# Patient Record
(demographics unavailable — no encounter records)

---

## 2024-10-08 NOTE — REVIEW OF SYSTEMS
[As Noted in HPI] : as noted in HPI [Negative] : Heme/Lymph [FreeTextEntry9] : R knee pain and crepitus on her L hip

## 2024-10-08 NOTE — DATA REVIEWED
[FreeTextEntry1] : Laboratory and radiology studies that were personally reviewed at today's visit are summarized below and above: 4-2024:  lumbar spine xray/hip xray:  narrowed L4-L5 and L5-S1 disc spaces, preserved hip joint spaces. enthesophyte formation on superolateral right greater trochanter margin.    Knee xray (8-5-2022): OA Xray hip (8-5-2022): DJD Lumbat spine - OA and enthesophyte DEXA:  8-2022L  osteopenia frax 10/1.6  3-1-21:  synovial fluid:  no crystals,  IDENTRA - negative ELIDA - negative ANCA, MPO, PR3 negative Vitamin D = 27.3 Ca 10.4 ESR = 26 CRP 0.51 mg/dL A1c 7.2% May 2020 and 7.6 in  after being on steroids.   COVID-19 Ab negative May 2020  US Left hand/wrist - scattered DJD changes,  small second PIP joint effusion, chronic synovitis and small fluid in the wrist.   5-27-20:  hgb = 11.3 and normocytic.  vitamin d = 25.  5- Dexa: OPenia   Dexa: OPenia with average risk

## 2024-10-08 NOTE — HISTORY OF PRESENT ILLNESS
[RF] : negative rheumatoid factor [CCP] : negative CCP antibody [Joint Swelling] : no joint swelling [Rash] : no rash [Shortness of Breath] : no shortness of breath [Joint Pain] : no joint pain [Ocular Symptoms] : no ocular symptoms [Dysphonia] : no dysphonia [Morning Stiffness] : no morning stiffness [Chest Pain] : no chest pain [Fatigue] : no fatigue [TextBox_2] : 9-2020 [TextBox_40] : HCQ [TextBox_70] : really feels that PT was helpful  and now continues stretches at home  seeing pmd for physical in 2 weeks.  has a clicking in the left hip but no pain since injection in 4-2024

## 2024-10-08 NOTE — PHYSICAL EXAM
[General Appearance - Alert] : alert [General Appearance - In No Acute Distress] : in no acute distress [General Appearance - Well Nourished] : well nourished [General Appearance - Well Developed] : well developed [Sclera] : the sclera and conjunctiva were normal [Extraocular Movements] : extraocular movements were intact [Outer Ear] : the ears and nose were normal in appearance [Skin Color & Pigmentation] : normal skin color and pigmentation [] : no rash [No Focal Deficits] : no focal deficits [Oriented To Time, Place, And Person] : oriented to person, place, and time [Impaired Insight] : insight and judgment were intact [Affect] : the affect was normal [Mood] : the mood was normal [FreeTextEntry1] : No psoriasis

## 2024-10-08 NOTE — ASSESSMENT
[FreeTextEntry1] : # Seronegative inflammatory arthritis  blood work negative for RA but US with synovitis.  Prior blood work reviewed check blood work today patient is feeling fine w/ mg BID  Optho Q 6 months  Continue on HCQ   # Suspect OA knees/lower back and hips  with chronic changes  and OA on imaging.  [] monitor over time. medications. [] continue home exercises    More than 50% of the encounter was spent counselling  LATOSHA NORTON on differential, workup, disease course, and treatment/management.   Education was provided to LATOSHA NORTON during this encounter. All questions and concerns were answered and addressed in detail.  LATOSHA NORTON verbalized understanding and agreed to plan  LATOSHA NORTON has been instructed to call for an earlier appointment if new symptoms develop  LATOSHA NORTON has been instructed to make a follow up appointment  6 months

## 2024-10-21 NOTE — PHYSICAL EXAM
[No Acute Distress] : no acute distress [Well Nourished] : well nourished [Well Developed] : well developed [Well-Appearing] : well-appearing [Normal Voice/Communication] : normal voice/communication [Normal Sclera/Conjunctiva] : normal sclera/conjunctiva [PERRL] : pupils equal round and reactive to light [EOMI] : extraocular movements intact [Normal Outer Ear/Nose] : the outer ears and nose were normal in appearance [Normal Oropharynx] : the oropharynx was normal [No JVD] : no jugular venous distention [Supple] : supple [No Respiratory Distress] : no respiratory distress  [No Accessory Muscle Use] : no accessory muscle use [Clear to Auscultation] : lungs were clear to auscultation bilaterally [Normal Rate] : normal rate  [Regular Rhythm] : with a regular rhythm [Normal S1, S2] : normal S1 and S2 [No Carotid Bruits] : no carotid bruits [No Edema] : there was no peripheral edema [No Extremity Clubbing/Cyanosis] : no extremity clubbing/cyanosis [Declined Breast Exam] : declined breast exam  [Soft] : abdomen soft [Normal Bowel Sounds] : normal bowel sounds [Declined Rectal Exam] : declined rectal exam [No CVA Tenderness] : no CVA  tenderness [No Spinal Tenderness] : no spinal tenderness [No Rash] : no rash [No Focal Deficits] : no focal deficits [Normal Gait] : normal gait [Speech Grossly Normal] : speech grossly normal [Normal Affect] : the affect was normal [Alert and Oriented x3] : oriented to person, place, and time [de-identified] : Wearing glasses [de-identified] : No sinus tenderness [de-identified] : No stridor [de-identified] : R = 16; no cough noted; good air entry; no wheezing [de-identified] : Normal bilateral radial pulses; no cords [de-identified] : Declined

## 2024-10-21 NOTE — ASSESSMENT
[FreeTextEntry1] : Hypertension Blood pressure in good range today Daily exercise, weight control, low-sodium diet Continue losartan HCTZ  Hyperlipidemia Exercise, weight control, low-fat diet Lipid profile to be done May need statin therapy if values above goal  Diabetes ADA diet Exercise, weight control On Metformin and Mounjaro as per Dr. Butterfield Endocrine follow-up Follow-up with ophthalmology and podiatry Hemoglobin A1c level and MA to be done Monitor sugars at home  Joint and back pain/muscle pain Rheum f/u planned Labs requested by Dr. Calderón sent today  Continue meds, diet, exercise as outlined F/U with all MD's Vaccines reviewed = flu vaccine given today Prescription to do mammography with breast ultrasonography given To call for any medical issues or if her status worsens/changes and to return to be seen immediately RTC 6 months for CPE and as needed All of the above was discussed in detail with the patient and all of her questions were answered She verbally confirmed understanding of all of the above and agreement with the above plan

## 2024-10-21 NOTE — HEALTH RISK ASSESSMENT
[No] : In the past 12 months have you used drugs other than those required for medical reasons? No [No falls in past year] : Patient reported no falls in the past year [Patient refused screening] : Patient refused screening [de-identified] : Rheumatology, physical therapy [de-identified] : Walking [de-identified] : Regular [Reviewed no changes] : Reviewed, no changes [AdvancecareDate] : 10/24 [Former] : Former

## 2024-10-21 NOTE — PHYSICAL EXAM
[No Acute Distress] : no acute distress [Well Nourished] : well nourished [Well Developed] : well developed [Well-Appearing] : well-appearing [Normal Voice/Communication] : normal voice/communication [Normal Sclera/Conjunctiva] : normal sclera/conjunctiva [PERRL] : pupils equal round and reactive to light [EOMI] : extraocular movements intact [Normal Outer Ear/Nose] : the outer ears and nose were normal in appearance [Normal Oropharynx] : the oropharynx was normal [No JVD] : no jugular venous distention [Supple] : supple [No Respiratory Distress] : no respiratory distress  [No Accessory Muscle Use] : no accessory muscle use [Clear to Auscultation] : lungs were clear to auscultation bilaterally [Normal Rate] : normal rate  [Regular Rhythm] : with a regular rhythm [Normal S1, S2] : normal S1 and S2 [No Carotid Bruits] : no carotid bruits [No Edema] : there was no peripheral edema [No Extremity Clubbing/Cyanosis] : no extremity clubbing/cyanosis [Declined Breast Exam] : declined breast exam  [Soft] : abdomen soft [Normal Bowel Sounds] : normal bowel sounds [Declined Rectal Exam] : declined rectal exam [No CVA Tenderness] : no CVA  tenderness [No Spinal Tenderness] : no spinal tenderness [No Rash] : no rash [No Focal Deficits] : no focal deficits [Normal Gait] : normal gait [Speech Grossly Normal] : speech grossly normal [Normal Affect] : the affect was normal [Alert and Oriented x3] : oriented to person, place, and time [de-identified] : Wearing glasses [de-identified] : No sinus tenderness [de-identified] : No stridor [de-identified] : R = 16; no cough noted; good air entry; no wheezing [de-identified] : Normal bilateral radial pulses; no cords [de-identified] : Declined

## 2024-10-21 NOTE — HISTORY OF PRESENT ILLNESS
[FreeTextEntry1] : Comes in for follow-up medical visit. [de-identified] : Needs flu vaccine today. Overdue for physical.  Prescription to do mammo and ultrasound given to do a soon as possible as overdue. Completely retired and happy. Feeling well.  Denies any new complaints today.

## 2024-10-21 NOTE — HEALTH RISK ASSESSMENT
[No] : In the past 12 months have you used drugs other than those required for medical reasons? No [No falls in past year] : Patient reported no falls in the past year [Patient refused screening] : Patient refused screening [de-identified] : Rheumatology, physical therapy [de-identified] : Walking [de-identified] : Regular [Reviewed no changes] : Reviewed, no changes [AdvancecareDate] : 10/24 [Former] : Former

## 2024-10-21 NOTE — HISTORY OF PRESENT ILLNESS
[FreeTextEntry1] : Comes in for follow-up medical visit. [de-identified] : Needs flu vaccine today. Overdue for physical.  Prescription to do mammo and ultrasound given to do a soon as possible as overdue. Completely retired and happy. Feeling well.  Denies any new complaints today.

## 2024-11-19 NOTE — PHYSICAL EXAM
[Alert] : alert [No Acute Distress] : no acute distress [Normal Sclera/Conjunctiva] : normal sclera/conjunctiva [EOMI] : extra ocular movement intact [No LAD] : no lymphadenopathy [Thyroid Not Enlarged] : the thyroid was not enlarged [No Respiratory Distress] : no respiratory distress [Clear to Auscultation] : lungs were clear to auscultation bilaterally [Normal S1, S2] : normal S1 and S2 [Regular Rhythm] : with a regular rhythm [No Edema] : no peripheral edema [Normal Bowel Sounds] : normal bowel sounds [Not Tender] : non-tender [Not Distended] : not distended [Soft] : abdomen soft [Normal Anterior Cervical Nodes] : no anterior cervical lymphadenopathy [No Spinal Tenderness] : no spinal tenderness [No Clubbing, Cyanosis] : no clubbing  or cyanosis of the fingernails [No Rash] : no rash [Right foot was examined, including] : right foot ~C was examined, including visual inspection with sensory and pulse exams [Left foot was examined, including] : left foot ~C was examined, including visual inspection with sensory and pulse exams [Normal] : normal [2+] : 2+ in the dorsalis pedis [Diminished Throughout Both Feet] : normal tactile sensation with monofilament testing throughout both feet [Normal Reflexes] : deep tendon reflexes were 2+ and symmetric [Normal Affect] : the affect was normal [Normal Mood] : the mood was normal [FreeTextEntry2] : onychomycosis [FreeTextEntry6] : onychomycosis [Kyphosis] : no kyphosis present [de-identified] : last foot exam in May 2024

## 2024-11-19 NOTE — ASSESSMENT
[Carbohydrate Consistent Diet] : carbohydrate consistent diet [Diabetes Foot Care] : diabetes foot care [Long Term Vascular Complications] : long term vascular complications of diabetes [Importance of Diet and Exercise] : importance of diet and exercise to improve glycemic control, achieve weight loss and improve cardiovascular health [Incretin Mimetic Therapy] : Risks and benefits of incretin mimetic therapy were discussed with the patient including nauseau, pancreatitis and potential risk of medullary thyroid cancer [FreeTextEntry1] : 76 y.o. female with h/o Type 2 DM, HTN, hyperlipidemia, thyroid nodules and osteopenia.  1. Type 2 DM- Good control with Hba1c of 6.4% in October 2024. Encouraged a carbohydrate consistent diet and exercise. Will continue Metformin 1,000 mg BID and Mounjaro 5 mg SQ weekly. Stable urine alb/cr ratio in October 2024.   2. HTN- BP is at goal and will continue current medication including ARB.  3. Hyperlipidemia- Lipids are at goal. Off statin because of muscle symptoms. Encouraged low fat diet and exercise.  4. Thyroid nodules- Patient is euthyroid. Thyroid nodules are stable and will repeat ultrasound in 2026.  5. Osteopenia- DEXA scan performed in October 2024 was reviewed. Given decrease in BMD, recommend medical therapy. We discussed option of oral bisphosphonates and reviewed risks and benefits including ONJ and atypical femur fractures. She will consider options and visit with her dentist. She will notify the office if she decides to start treatment. Encouraged weight bearing activity. Repeat DEXA scan in 2026. Will continue vitamin D supplement.  6. Hypercalcemia- Discussed pathophysiology including possible etiology such as primary hyperparathyroidism, dehydration and secondary to HCTZ use. Would avoid calcium supplements and encouraged hydration. Will monitor for now given serum calcium is < 11.5. Would consider oral bisphosphonate given decrease in BMD.     Follow up in 4 to 6 months.   [Bisphosphonate Therapy] : Risks  and benefits of bisphosphonate therapy were  discussed with the patient including gastroesophageal irritation, osteonecrosis of the jaw, and atypical femur fractures, and acute phase reaction [Bisphosphonates] : The patient was instructed to take bisphosphonates on an empty stomach with a full glass of water,and wait at least 30 minutes before eating or lying down

## 2024-11-19 NOTE — REVIEW OF SYSTEMS
[Joint Pain] : joint pain [Negative] : Integumentary [Fatigue] : no fatigue [Recent Weight Gain (___ Lbs)] : no recent weight gain [Recent Weight Loss (___ Lbs)] : no recent weight loss [Pain/Numbness of Digits] : no pain/numbness of digits [Polydipsia] : no polydipsia [Cold Intolerance] : no cold intolerance [Heat Intolerance] : no heat intolerance [Swelling] : no swelling

## 2024-11-19 NOTE — HISTORY OF PRESENT ILLNESS
[FreeTextEntry1] : 76 y.o. female with h/o Type 2 DM, HTN, hyperlipidemia and thyroid nodules here for follow up visit.   Saw rheumatology and diagnosed with inflammatory arthritis. C/o wrist and finger pain but better and follows with rheumatology.   Monitoring FS 2 to 3 times per week and fasting numbers are on average 110s.  Taking Metformin 1,000 mg BID and Mounjaro 5 mg SQ weekly  Was taking Victoza 1.8 mg SQ daily which she was tolerating. Tried Jardiance 10 mg daily in December 2020 but stopped secondary to mycotic infection.  No hypoglycemia. Reports weight is stable. Reports decrease in activity. Diet has been stable. No polyuria and no polydipsia.   UTD with ophthalmology (2024) and no retinopathy. No foot complaints. Sees podiatry, Dr. Diallo, for nail care. Off statin because of muscle symptoms. No proteinuria.   In regard to thyroid disease, no neck complaints.  Thyroid ultrasound in October 2017 showed stable b/l nodules but increase in left lower pole measuring 1.4 cm. Was planning for FNA of b/l dominant nodules; however, patient saw radiology Dr. Gotti who did not feel FNA was needed.  Thyroid ultrasound on 12/9/2020 showed stable b/l nodules largest on left 1.6 cm and on right 1.0 cm.  Thyroid ultrasound on 5/30/23 shows b/l nodules with right dominant spongiform nodule 1.9 cm and left lower pole nodule 1.0 cm.   In regard to osteopenia, no falls or fractures.  Taking vitamin D 2,000 Iu every day. Less GERD and off medication. Has diet modification and elevation of head of the bed. Planning to follow up with dentist. Now has dentures.   DEXA scan in May 2016 showed falsely elevated spine 2.0 with arthritis, left femoral neck -1.3 and total hip -0.2.  DEXA scan in May 2018 shows left femoral neck -1.5 which is a 3.7% decrease, total hip -0.4 which is a 3% decrease. 1/3 radius -1.4.  DEXA scan in August 2020 showed 1/3 radius -1.5 which is stable, left femoral neck -1.3 and total hip -0.7 which are stable. DEXA scan in August 2022 shows left femoral neck -1.2 which is stable, total hip -0.5 which is stable. 1/3 radius -1.3 which is stable. DEXA scan in October 2024 shows left femoral neck -1.6 which is a 5.3% decrease and total hip -0.9 which is a 5.3% decrease and 1/3 radius -2.0 which is a 6.8% decrease= FRAX score 12%/2.4%.    H/o hypercalcemia with mid normal intact PTH levels. No h/o kidney stones. She stopped all calcium supplements but takes MVI. Does not drink much water throughout the day. Also on HCTZ for her HTN. No polyuria or polydipsia.

## 2024-11-19 NOTE — HISTORY OF PRESENT ILLNESS
[FreeTextEntry1] : 76 y.o. female with h/o Type 2 DM, HTN, hyperlipidemia and thyroid nodules here for follow up visit.   Saw rheumatology and diagnosed with inflammatory arthritis. C/o wrist and finger pain but better and follows with rheumatology.   Monitoring FS 2 to 3 times per week and fasting numbers are on average 110s.  Taking Metformin 1,000 mg BID and Mounjaro 5 mg SQ weekly  Was taking Victoza 1.8 mg SQ daily which she was tolerating. Tried Jardiance 10 mg daily in December 2020 but stopped secondary to mycotic infection.  No hypoglycemia. Reports weight is stable. Reports decrease in activity. Diet has been stable. No polyuria and no polydipsia.   UTD with ophthalmology (2024) and no retinopathy. No foot complaints. Sees podiatry, Dr. Diallo, for nail care. Off statin because of muscle symptoms. No proteinuria.   In regard to thyroid disease, no neck complaints.  Thyroid ultrasound in October 2017 showed stable b/l nodules but increase in left lower pole measuring 1.4 cm. Was planning for FNA of b/l dominant nodules; however, patient saw radiology Dr. oGtti who did not feel FNA was needed.  Thyroid ultrasound on 12/9/2020 showed stable b/l nodules largest on left 1.6 cm and on right 1.0 cm.  Thyroid ultrasound on 5/30/23 shows b/l nodules with right dominant spongiform nodule 1.9 cm and left lower pole nodule 1.0 cm.   In regard to osteopenia, no falls or fractures.  Taking vitamin D 2,000 Iu every day. Less GERD and off medication. Has diet modification and elevation of head of the bed. Planning to follow up with dentist. Now has dentures.   DEXA scan in May 2016 showed falsely elevated spine 2.0 with arthritis, left femoral neck -1.3 and total hip -0.2.  DEXA scan in May 2018 shows left femoral neck -1.5 which is a 3.7% decrease, total hip -0.4 which is a 3% decrease. 1/3 radius -1.4.  DEXA scan in August 2020 showed 1/3 radius -1.5 which is stable, left femoral neck -1.3 and total hip -0.7 which are stable. DEXA scan in August 2022 shows left femoral neck -1.2 which is stable, total hip -0.5 which is stable. 1/3 radius -1.3 which is stable. DEXA scan in October 2024 shows left femoral neck -1.6 which is a 5.3% decrease and total hip -0.9 which is a 5.3% decrease and 1/3 radius -2.0 which is a 6.8% decrease= FRAX score 12%/2.4%.    H/o hypercalcemia with mid normal intact PTH levels. No h/o kidney stones. She stopped all calcium supplements but takes MVI. Does not drink much water throughout the day. Also on HCTZ for her HTN. No polyuria or polydipsia.

## 2024-11-19 NOTE — PHYSICAL EXAM
[Alert] : alert [No Acute Distress] : no acute distress [Normal Sclera/Conjunctiva] : normal sclera/conjunctiva [EOMI] : extra ocular movement intact [No LAD] : no lymphadenopathy [Thyroid Not Enlarged] : the thyroid was not enlarged [No Respiratory Distress] : no respiratory distress [Clear to Auscultation] : lungs were clear to auscultation bilaterally [Normal S1, S2] : normal S1 and S2 [Regular Rhythm] : with a regular rhythm [No Edema] : no peripheral edema [Normal Bowel Sounds] : normal bowel sounds [Not Tender] : non-tender [Not Distended] : not distended [Soft] : abdomen soft [Normal Anterior Cervical Nodes] : no anterior cervical lymphadenopathy [No Spinal Tenderness] : no spinal tenderness [No Clubbing, Cyanosis] : no clubbing  or cyanosis of the fingernails [No Rash] : no rash [Right foot was examined, including] : right foot ~C was examined, including visual inspection with sensory and pulse exams [Left foot was examined, including] : left foot ~C was examined, including visual inspection with sensory and pulse exams [Normal] : normal [2+] : 2+ in the dorsalis pedis [Diminished Throughout Both Feet] : normal tactile sensation with monofilament testing throughout both feet [Normal Reflexes] : deep tendon reflexes were 2+ and symmetric [Normal Affect] : the affect was normal [Normal Mood] : the mood was normal [FreeTextEntry2] : onychomycosis [FreeTextEntry6] : onychomycosis [Kyphosis] : no kyphosis present [de-identified] : last foot exam in May 2024

## 2025-02-10 NOTE — PHYSICAL EXAM
[No Acute Distress] : in no acute distress [Well developed] : well developed [Well Nourished] : ~L well nourished [Good Hygeine] : demonstrates good hygeine [Oriented x3] : oriented to person, place, and time [Normal Memory] : ~T memory was ~L unimpaired [Normal Mood/Affect] : mood and affect are normal [Warm and Dry] : was warm and dry to touch [Normal Gait] : gait was normal [Vulvar Atrophy] : vulvar atrophy [Labia Majora] : were normal [Normal] : was normal [Normal Appearance] : general appearance was normal [Atrophy] : atrophy [No Bleeding] : there was no active vaginal bleeding [Anxiety] : patient is not anxious [Tenderness] : ~T no ~M abdominal tenderness observed [Distended] : not distended

## 2025-02-10 NOTE — PROCEDURE
[Good Fit] : fits well [Discharge] : there is vaginal discharge [Pessary Inserted] : inserted [Pessary Washed] : washed [None] : no bleeding [Medication Review] : Medicaiton Review: Patient verbalizes understanding of risks and benefits [Bowel Management] : Bowel Management: patient verbalizes understanding of daily dietary fiber intake [Refit] : refit is not needed [Erosion] : no evidence of erosion [Erythema] : no erythema [Infection] : no evidence of infection [FreeTextEntry1] : RS #5

## 2025-02-10 NOTE — HISTORY OF PRESENT ILLNESS
[FreeTextEntry1] : Leonora is a 77 y/o that presents to the office for follow up for pelvic prolapse, supported with RS #5.  She denies any pelvic pain or vaginal bleeding. She denies any issue with urination or moving her bowels.  She is performing self-care and denies issues.

## 2025-02-10 NOTE — DISCUSSION/SUMMARY
[FreeTextEntry1] : Pelvic Prolapse: -Continue weekly self-care  -Continue with RS #5 -Precautions and instructions given.  Will follow up in 3 months or sooner if needed. Advised to call the office should any issues arise.

## 2025-04-09 NOTE — HEALTH RISK ASSESSMENT
[Very Good] : ~his/her~  mood as very good [Intercurrent Urgi Care visits] : went to urgent care [Monthly or less (1 pt)] : Monthly or less (1 point) [1 or 2 (0 pts)] : 1 or 2 (0 points) [Never (0 pts)] : Never (0 points) [No] : In the past 12 months have you used drugs other than those required for medical reasons? No [No falls in past year] : Patient reported no falls in the past year [0] : 2) Feeling down, depressed, or hopeless: Not at all (0) [PHQ-2 Negative - No further assessment needed] : PHQ-2 Negative - No further assessment needed [PHQ-9 Negative - No further assessment needed] : PHQ-9 Negative - No further assessment needed [Former] : Former [5-9] : 5-9 [> 15 Years] : > 15 Years [Patient declined colonoscopy] : Patient declined colonoscopy [HIV test declined] : HIV test declined [Hepatitis C test declined] : Hepatitis C test declined [With Family] : lives with family [# of Members in Household ___] :  household currently consist of [unfilled] member(s) [Retired] : retired [College] : College [] :  [# Of Children ___] : has [unfilled] children [Feels Safe at Home] : Feels safe at home [Fully functional (bathing, dressing, toileting, transferring, walking, feeding)] : Fully functional (bathing, dressing, toileting, transferring, walking, feeding) [Fully functional (using the telephone, shopping, preparing meals, housekeeping, doing laundry, using] : Fully functional and needs no help or supervision to perform IADLs (using the telephone, shopping, preparing meals, housekeeping, doing laundry, using transportation, managing medications and managing finances) [Smoke Detector] : smoke detector [Carbon Monoxide Detector] : carbon monoxide detector [Seat Belt] :  uses seat belt [Sunscreen] : uses sunscreen [With Patient/Caregiver] : , with patient/caregiver [Designated Healthcare Proxy] : Designated healthcare proxy [Name: ___] : Health Care Proxy's Name: [unfilled]  [Relationship: ___] : Relationship: [unfilled] [Little interest or pleasure doing things] : 1) Little interest or pleasure doing things [Feeling down, depressed, or hopeless] : 2) Feeling down, depressed, or hopeless [None] : Patient does not have any barriers to medication adherence [Opioids] : opioids [NO] : No [No Retinopathy] : No retinopathy [Yes] : Reviewed medication list for presence of high-risk medications. [FreeTextEntry1] : none [de-identified] : rheum, endocrine, ophtho, GYN [Audit-CScore] : 1 [de-identified] : walking/biking [de-identified] : regular [[id number]] : 0 [EyeExamDate] : 03/25 [Change in mental status noted] : No change in mental status noted [Language] : denies difficulty with language [Behavior] : denies difficulty with behavior [Learning/Retaining New Information] : denies difficulty learning/retaining new information [Handling Complex Tasks] : denies difficulty handling complex tasks [Reasoning] : denies difficulty with reasoning [Spatial Ability and Orientation] : denies difficulty with spatial ability and orientation [Reports changes in hearing] : Reports no changes in hearing [Reports changes in vision] : Reports no changes in vision [Reports changes in dental health] : Reports no changes in dental health [Travel to Developing Areas] : does not  travel to developing areas [TB Exposure] : is not being exposed to tuberculosis [Caregiver Concerns] : does not have caregiver concerns [MammogramDate] : 11/24 [PapSmearDate] : 2025 [BoneDensityDate] : 10/24 [ColonoscopyDate] : 10/14 [AdvancecareDate] : 04/25

## 2025-04-09 NOTE — HISTORY OF PRESENT ILLNESS
[FreeTextEntry1] : Comes in for annual physical. [de-identified] : Feeling well. Had covid 8/2022. Mild case with no residual effects.

## 2025-04-09 NOTE — ASSESSMENT
[Vaccines Reviewed] : Immunizations reviewed today. Please see immunization details in the vaccine log within the immunization flowsheet.  [FreeTextEntry1] : See health maintenance assessment and plan outlined below. In addition: Full labs and urine testing done today Had bone density 2024 Podiatry f/u 2025 for diabetic foot care Ortho f/u as needed 2025 Rheum f/u 2025 Colonoscopy to be done 2025 as overdue Kit given to do FIT testing for now Saw GYN 2025 Did mammograms 11/24 = due again 11/2025 Urology f/u as needed 2025 Continue meds, diet, exercise as outlined EKG done and report d/w patient and scanned Consider cardiology evaluation 2025 CXR done 2023 Endocrine f/u 2025 with thyroid US Vaccines reviewed with patient ENT f/u 2025 To see derm, ophtho, dentist 2025 RTC for annual physical  RTC 4 months and as needed To call for any medical issues or if her status worsens/changes and to return to be seen immediately All of the above was discussed in detail with the patient and all of her questions were answered She verbally confirmed understanding of all of the above and agreement with the above plan

## 2025-04-09 NOTE — PHYSICAL EXAM
[No Acute Distress] : no acute distress [Well Nourished] : well nourished [Well Developed] : well developed [Well-Appearing] : well-appearing [Normal Voice/Communication] : normal voice/communication [Normal Sclera/Conjunctiva] : normal sclera/conjunctiva [PERRL] : pupils equal round and reactive to light [EOMI] : extraocular movements intact [Normal Outer Ear/Nose] : the outer ears and nose were normal in appearance [Normal Oropharynx] : the oropharynx was normal [No JVD] : no jugular venous distention [Supple] : supple [No Lymphadenopathy] : no lymphadenopathy [No Respiratory Distress] : no respiratory distress  [Clear to Auscultation] : lungs were clear to auscultation bilaterally [No Accessory Muscle Use] : no accessory muscle use [Normal Rate] : normal rate  [Regular Rhythm] : with a regular rhythm [Normal S1, S2] : normal S1 and S2 [No Carotid Bruits] : no carotid bruits [Pedal Pulses Present] : the pedal pulses are present [No Edema] : there was no peripheral edema [No Extremity Clubbing/Cyanosis] : no extremity clubbing/cyanosis [Normal Appearance] : normal in appearance [No Nipple Discharge] : no nipple discharge [No Axillary Lymphadenopathy] : no axillary lymphadenopathy [Soft] : abdomen soft [Non Tender] : non-tender [Non-distended] : non-distended [No Masses] : no abdominal mass palpated [No HSM] : no HSM [Normal Bowel Sounds] : normal bowel sounds [Declined Rectal Exam] : declined rectal exam [Normal Supraclavicular Nodes] : no supraclavicular lymphadenopathy [Normal Axillary Nodes] : no axillary lymphadenopathy [Normal Posterior Cervical Nodes] : no posterior cervical lymphadenopathy [Normal Anterior Cervical Nodes] : no anterior cervical lymphadenopathy [No CVA Tenderness] : no CVA  tenderness [No Spinal Tenderness] : no spinal tenderness [Grossly Normal Strength/Tone] : grossly normal strength/tone [No Rash] : no rash [Normal Gait] : normal gait [Coordination Grossly Intact] : coordination grossly intact [No Focal Deficits] : no focal deficits [Speech Grossly Normal] : speech grossly normal [Normal Affect] : the affect was normal [Alert and Oriented x3] : oriented to person, place, and time [de-identified] : Wearing glasses [de-identified] :  no ST; right TM obscured with cerumen; left TM clear [de-identified] : No stridor or TM [de-identified] : R=16 [de-identified] : normal radial pulses; no cords [de-identified] : as per GYN

## 2025-05-05 NOTE — PHYSICAL EXAM
[Alert] : alert [No Acute Distress] : no acute distress [Normal Sclera/Conjunctiva] : normal sclera/conjunctiva [EOMI] : extra ocular movement intact [No LAD] : no lymphadenopathy [Thyroid Not Enlarged] : the thyroid was not enlarged [No Respiratory Distress] : no respiratory distress [Clear to Auscultation] : lungs were clear to auscultation bilaterally [Normal S1, S2] : normal S1 and S2 [Regular Rhythm] : with a regular rhythm [No Edema] : no peripheral edema [Normal Bowel Sounds] : normal bowel sounds [Not Tender] : non-tender [Not Distended] : not distended [Soft] : abdomen soft [Normal Anterior Cervical Nodes] : no anterior cervical lymphadenopathy [No Spinal Tenderness] : no spinal tenderness [No Clubbing, Cyanosis] : no clubbing  or cyanosis of the fingernails [No Rash] : no rash [Normal Reflexes] : deep tendon reflexes were 2+ and symmetric [Normal Affect] : the affect was normal [Normal Mood] : the mood was normal [Right foot was examined, including] : right foot ~C was examined, including visual inspection with sensory and pulse exams [Left foot was examined, including] : left foot ~C was examined, including visual inspection with sensory and pulse exams [2+] : 2+ in the dorsalis pedis [Kyphosis] : no kyphosis present [Diminished Throughout Both Feet] : normal tactile sensation with monofilament testing throughout both feet [FreeTextEntry1] : foot deformity [FreeTextEntry2] : dystrophic nails [FreeTextEntry5] : foot deformity [FreeTextEntry6] : dystrophic nails

## 2025-05-05 NOTE — ASSESSMENT
[Carbohydrate Consistent Diet] : carbohydrate consistent diet [Diabetes Foot Care] : diabetes foot care [Long Term Vascular Complications] : long term vascular complications of diabetes [Importance of Diet and Exercise] : importance of diet and exercise to improve glycemic control, achieve weight loss and improve cardiovascular health [Bisphosphonate Therapy] : Risks  and benefits of bisphosphonate therapy were  discussed with the patient including gastroesophageal irritation, osteonecrosis of the jaw, and atypical femur fractures, and acute phase reaction [Incretin Mimetic Therapy] : Risks and benefits of incretin mimetic therapy were discussed with the patient including nauseau, pancreatitis and potential risk of medullary thyroid cancer [Bisphosphonates] : The patient was instructed to take bisphosphonates on an empty stomach with a full glass of water,and wait at least 30 minutes before eating or lying down [FreeTextEntry1] : 76 y.o. female with h/o Type 2 DM, HTN, hyperlipidemia, thyroid nodules and osteopenia.  1. Type 2 DM- Good control with Hba1c of 6.5% in April 2025. Encouraged a carbohydrate consistent diet and exercise. Will continue Metformin 1,000 mg BID and Mounjaro 5 mg SQ weekly. Recommend taking famotidine in advance of Mounjaro injection and for 48 hours after injection. Stable urine alb/cr ratio in April 2025.   2. HTN- BP is at goal and will continue current medication including ARB.  3. Hyperlipidemia- LDL is above goal. Off statin because of muscle symptoms. Encouraged low fat diet and exercise.  4. Thyroid nodules- Patient is euthyroid. Thyroid nodules are stable and will repeat ultrasound in 2026.  5. Osteopenia- DEXA scan performed in October 2024 was reviewed. Given decrease in BMD, recommend medical therapy. We discussed option of oral bisphosphonates and reviewed risks and benefits including ONJ and atypical femur fractures. She prefers to monitor for now. Encouraged weight bearing activity. Repeat DEXA scan in 2026. Will continue vitamin D supplement.  6. Hypercalcemia- Discussed pathophysiology including possible etiology such as primary hyperparathyroidism, dehydration and secondary to HCTZ use. Would avoid calcium supplements and encouraged hydration. Will monitor for now given serum calcium is < 11.5. Would consider oral bisphosphonate given decrease in BMD.     Follow up in 4 to 6 months.

## 2025-05-05 NOTE — HISTORY OF PRESENT ILLNESS
[FreeTextEntry1] : 76 y.o. female with h/o Type 2 DM, HTN, hyperlipidemia, thyroid nodules and osteopenia here for follow up visit.   No acute events since the last visit.   Saw rheumatology and diagnosed with inflammatory arthritis. C/o wrist and finger pain but better and follows with rheumatology.   Monitoring FS 2 to 3 times per week and fasting numbers are on average 110s.  Taking Metformin 1,000 mg BID and Mounjaro 5 mg SQ weekly  She reports 1 to 2 days after Mounjaro injection develops belching, stomach rumbling and diarrhea but brief.    She was taking Victoza 1.8 mg SQ daily which she was tolerating.  Tried Jardiance 10 mg daily in December 2020 but stopped secondary to mycotic infection.   No hypoglycemia. Reports weight is stable. Reports decrease in activity. Diet has been stable. No polyuria and no polydipsia.   UTD with ophthalmology (2025) and no retinopathy. No foot complaints. Sees podiatry, Dr. Diallo, for nail care. Off statin because of muscle symptoms. No proteinuria.   In regard to thyroid disease, no neck complaints.  Thyroid ultrasound in October 2017 showed stable b/l nodules but increase in left lower pole measuring 1.4 cm. Was planning for FNA of b/l dominant nodules; however, patient saw radiology Dr. Gotti who did not feel FNA was needed.  Thyroid ultrasound on 12/9/2020 showed stable b/l nodules largest on left 1.6 cm and on right 1.0 cm.  Thyroid ultrasound on 5/30/23 shows b/l nodules with right dominant spongiform nodule 1.9 cm and left lower pole nodule 1.0 cm.   In regard to osteopenia, no falls or fractures.  Taking vitamin D 2,000 Iu every day. Less GERD and off medication. Has diet modification and elevation of head of the bed. Now has dentures.   DEXA scan in May 2016 showed falsely elevated spine 2.0 with arthritis, left femoral neck -1.3 and total hip -0.2.  DEXA scan in May 2018 shows left femoral neck -1.5 which is a 3.7% decrease, total hip -0.4 which is a 3% decrease. 1/3 radius -1.4.  DEXA scan in August 2020 showed 1/3 radius -1.5 which is stable, left femoral neck -1.3 and total hip -0.7 which are stable. DEXA scan in August 2022 shows left femoral neck -1.2 which is stable, total hip -0.5 which is stable. 1/3 radius -1.3 which is stable. DEXA scan in October 2024 shows left femoral neck -1.6 which is a 5.3% decrease and total hip -0.9 which is a 5.3% decrease and 1/3 radius -2.0 which is a 6.8% decrease= FRAX score 12%/2.4%.    H/o hypercalcemia with mid normal intact PTH levels. No h/o kidney stones. She stopped all calcium supplements but takes MVI. Does not drink much water throughout the day. Also on HCTZ for her HTN. No polyuria or polydipsia.

## 2025-07-24 NOTE — HISTORY OF PRESENT ILLNESS
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  recent left knee pain was resolved with wrap and anti-inflammatory  doing home exercises after PT and doing stretching.

## 2025-07-24 NOTE — ASSESSMENT
[FreeTextEntry1] : # Seronegative inflammatory arthritis  blood work negative for RA but US with synovitis.  Prior blood work reviewed check blood work today patient is feeling fine w/ mg BID Optho Q 6 months  Continue on HCQ  check hand/foot x-rays today   # Suspect OA knees/lower back and hips with chronic changes and OA on imaging.  [] monitor over time. medications. [] continue home exercises  [] prefer to use meloxicam over otc nsaid so will add for only prn   More than 50% of the encounter was spent counselling  LATOSHA NORTON on differential, workup, disease course, and treatment/management.   Education was provided to LATOSHA NORTON during this encounter. All questions and concerns were answered and addressed in detail.  LATOSHA NORTON verbalized understanding and agreed to plan  LATOSHA NORTON has been instructed to call for an earlier appointment if new symptoms develop  LATOSHA NORTON has been instructed to make a follow up appointment  6 months